# Patient Record
Sex: FEMALE | Race: WHITE | NOT HISPANIC OR LATINO | Employment: OTHER | URBAN - METROPOLITAN AREA
[De-identification: names, ages, dates, MRNs, and addresses within clinical notes are randomized per-mention and may not be internally consistent; named-entity substitution may affect disease eponyms.]

---

## 2022-07-01 PROBLEM — Z96.82 PRESENCE OF NEUROSTIMULATOR: Status: ACTIVE | Noted: 2022-07-01

## 2022-07-01 PROBLEM — G90.511 COMPLEX REGIONAL PAIN SYNDROME TYPE 1 OF RIGHT UPPER EXTREMITY: Status: ACTIVE | Noted: 2022-07-01

## 2022-07-01 PROBLEM — M47.816 SPONDYLOSIS OF LUMBAR REGION WITHOUT MYELOPATHY OR RADICULOPATHY: Status: ACTIVE | Noted: 2022-07-01

## 2022-07-05 ENCOUNTER — OFFICE VISIT (OUTPATIENT)
Dept: PAIN MEDICINE | Facility: CLINIC | Age: 57
End: 2022-07-05

## 2022-07-05 ENCOUNTER — PATIENT ROUNDING (BHMG ONLY) (OUTPATIENT)
Dept: PAIN MEDICINE | Facility: CLINIC | Age: 57
End: 2022-07-05

## 2022-07-05 VITALS
HEIGHT: 70 IN | HEART RATE: 73 BPM | WEIGHT: 138.85 LBS | DIASTOLIC BLOOD PRESSURE: 89 MMHG | TEMPERATURE: 97.5 F | BODY MASS INDEX: 19.88 KG/M2 | OXYGEN SATURATION: 94 % | SYSTOLIC BLOOD PRESSURE: 154 MMHG

## 2022-07-05 DIAGNOSIS — G90.511 COMPLEX REGIONAL PAIN SYNDROME TYPE 1 OF RIGHT UPPER EXTREMITY: Primary | ICD-10-CM

## 2022-07-05 DIAGNOSIS — M47.816 SPONDYLOSIS OF LUMBAR REGION WITHOUT MYELOPATHY OR RADICULOPATHY: ICD-10-CM

## 2022-07-05 DIAGNOSIS — R20.2 PARESTHESIA OF BOTH FEET: ICD-10-CM

## 2022-07-05 DIAGNOSIS — M24.541 FLEXION CONTRACTURE OF JOINT OF HAND, RIGHT: ICD-10-CM

## 2022-07-05 DIAGNOSIS — Z46.2 ENCOUNTER FOR FITTING AND ADJUSTMENT OF NEUROPACEMAKER OF SPINAL CORD: ICD-10-CM

## 2022-07-05 DIAGNOSIS — G47.01 INSOMNIA DUE TO MEDICAL CONDITION: ICD-10-CM

## 2022-07-05 DIAGNOSIS — M47.812 CERVICAL SPONDYLOSIS WITHOUT MYELOPATHY: ICD-10-CM

## 2022-07-05 DIAGNOSIS — Z96.82 PRESENCE OF NEUROSTIMULATOR: ICD-10-CM

## 2022-07-05 DIAGNOSIS — G90.511 COMPLEX REGIONAL PAIN SYNDROME TYPE 1 OF RIGHT UPPER EXTREMITY: ICD-10-CM

## 2022-07-05 PROCEDURE — 99205 OFFICE O/P NEW HI 60 MIN: CPT | Performed by: ANESTHESIOLOGY

## 2022-07-05 PROCEDURE — 95972 ALYS CPLX SP/PN NPGT W/PRGRM: CPT | Performed by: ANESTHESIOLOGY

## 2022-07-05 RX ORDER — NORTRIPTYLINE HYDROCHLORIDE 10 MG/1
CAPSULE ORAL
Qty: 60 CAPSULE | Refills: 1 | Status: SHIPPED | OUTPATIENT
Start: 2022-07-05 | End: 2023-03-20

## 2022-07-05 RX ORDER — ST. JOHN'S WORT 300 MG
400 CAPSULE ORAL 3 TIMES DAILY
Qty: 180 CAPSULE | Refills: 5 | Status: SHIPPED | OUTPATIENT
Start: 2022-07-05 | End: 2023-03-20

## 2022-07-05 RX ORDER — GABAPENTIN 300 MG/1
CAPSULE ORAL
COMMUNITY
Start: 2022-06-23

## 2022-07-05 RX ORDER — LEVOTHYROXINE SODIUM 0.03 MG/1
TABLET ORAL
COMMUNITY

## 2022-07-05 RX ORDER — ME-TETRAHYDROFOLATE/B12/HRB236 1-1-500 MG
1 CAPSULE ORAL DAILY
Qty: 90 CAPSULE | Refills: 1 | Status: SHIPPED | OUTPATIENT
Start: 2022-07-05 | End: 2023-03-20

## 2022-07-05 RX ORDER — ZOLPIDEM TARTRATE 10 MG/1
TABLET ORAL
COMMUNITY
Start: 2022-06-28

## 2022-07-05 RX ORDER — MULTIVITAMIN WITH IRON
100 TABLET ORAL DAILY
Qty: 30 TABLET | Refills: 0 | Status: SHIPPED | OUTPATIENT
Start: 2022-07-05 | End: 2023-03-20

## 2022-07-05 NOTE — PROGRESS NOTES
A MY-CHART MESSAGE HAS BEEN SENT TO THE PATIENT FOR PATIENT ROUNDING WITH [facility].

## 2022-07-06 ENCOUNTER — HOSPITAL ENCOUNTER (OUTPATIENT)
Dept: GENERAL RADIOLOGY | Facility: HOSPITAL | Age: 57
Discharge: HOME | End: 2022-07-06
Admitting: ANESTHESIOLOGY

## 2022-07-06 DIAGNOSIS — M47.812 CERVICAL SPONDYLOSIS WITHOUT MYELOPATHY: ICD-10-CM

## 2022-07-06 PROCEDURE — 72052 X-RAY EXAM NECK SPINE 6/>VWS: CPT

## 2022-07-20 ENCOUNTER — OUTSIDE FACILITY SERVICE (OUTPATIENT)
Dept: PAIN MEDICINE | Facility: CLINIC | Age: 57
End: 2022-07-20

## 2022-07-20 ENCOUNTER — TELEPHONE (OUTPATIENT)
Dept: PAIN MEDICINE | Facility: CLINIC | Age: 57
End: 2022-07-20

## 2022-07-20 DIAGNOSIS — G90.511 COMPLEX REGIONAL PAIN SYNDROME TYPE 1 OF RIGHT UPPER EXTREMITY: Primary | ICD-10-CM

## 2022-07-20 DIAGNOSIS — M47.812 CERVICAL SPONDYLOSIS WITHOUT MYELOPATHY: ICD-10-CM

## 2022-07-20 PROCEDURE — 64491 INJ PARAVERT F JNT C/T 2 LEV: CPT | Performed by: ANESTHESIOLOGY

## 2022-07-20 PROCEDURE — 99152 MOD SED SAME PHYS/QHP 5/>YRS: CPT | Performed by: ANESTHESIOLOGY

## 2022-07-20 PROCEDURE — 64490 INJ PARAVERT F JNT C/T 1 LEV: CPT | Performed by: ANESTHESIOLOGY

## 2022-07-20 NOTE — TELEPHONE ENCOUNTER
Patient called for update for referral placed for hand. Call placed to clinic they stated they were unable to locate the referral and asked for referral to be resent.

## 2022-07-21 ENCOUNTER — TELEPHONE (OUTPATIENT)
Dept: PAIN MEDICINE | Facility: CLINIC | Age: 57
End: 2022-07-21

## 2022-07-21 NOTE — TELEPHONE ENCOUNTER
Spoke with pt regarding how they’re feeling after yesterday’s procedure with Dr. Stone. Pt advised doing well with no complaints. I advised of next procedure date and time. Also advised nothing to eat or drink the night prior, must have a , and the procedure is in the [address]. Pt verbalized understanding, no further needs expressed.

## 2022-08-09 DIAGNOSIS — M47.812 CERVICAL SPONDYLOSIS WITHOUT MYELOPATHY: Primary | ICD-10-CM

## 2022-08-10 ENCOUNTER — OUTSIDE FACILITY SERVICE (OUTPATIENT)
Dept: PAIN MEDICINE | Facility: CLINIC | Age: 57
End: 2022-08-10

## 2022-08-10 PROCEDURE — 99152 MOD SED SAME PHYS/QHP 5/>YRS: CPT | Performed by: ANESTHESIOLOGY

## 2022-08-10 PROCEDURE — 64633 DESTROY CERV/THOR FACET JNT: CPT | Performed by: ANESTHESIOLOGY

## 2022-08-10 PROCEDURE — 64634 DESTROY C/TH FACET JNT ADDL: CPT | Performed by: ANESTHESIOLOGY

## 2022-08-11 ENCOUNTER — TELEPHONE (OUTPATIENT)
Dept: PAIN MEDICINE | Facility: CLINIC | Age: 57
End: 2022-08-11

## 2022-08-11 NOTE — TELEPHONE ENCOUNTER
Spoke with pt regarding yesterday’s procedure with Dr. Stone. Pt stated they are doing well, with no complaints. Advised of f/u appointment. Pt understood, and no further needs expressed

## 2022-08-24 ENCOUNTER — HOSPITAL ENCOUNTER (OUTPATIENT)
Dept: NEUROLOGY | Facility: HOSPITAL | Age: 57
Discharge: HOME | End: 2022-08-24
Admitting: ANESTHESIOLOGY

## 2022-08-24 DIAGNOSIS — R20.2 PARESTHESIA OF BOTH FEET: ICD-10-CM

## 2022-08-24 PROCEDURE — 95912 NRV CNDJ TEST 11-12 STUDIES: CPT

## 2022-08-24 PROCEDURE — 95886 MUSC TEST DONE W/N TEST COMP: CPT

## 2023-03-20 NOTE — PROGRESS NOTES
"Chief Complaint: \"Neck pain and numbness and burning in my feet.\"        History of Present Illness:   Patient: Ms. Althea Oneill, 57 y.o. female originally referred by Dr. Pilo Riggins in consultation for chronic intractable right upper extremity pain secondary to CRPS.  Patient reports a longstanding history of CRPS type one of the right upper extremity, which began after undergoing numerous surgical interventions of her right hand.  She has a spinal cord stimulator device that was implanted in 2008, with recent revision and IPG exchange in 2021.  She is a previous patient of a pain medicine center in [place], where she was treated for chronic neck pain and CRPS of the right upper extremity.  Her stimulator device does assist with reduction of her symptoms in her right arm.  She continues to have chronic mechanical/axial neck pain.  We last saw her on August 10, 2022, when she underwent bilateral cervical medial branch rhizotomies at C4, C5, and C6, from which she reports experiencing overall 70 to 80% pain relief and functional improvement that is ongoing.  She completed a recent course of physical therapy, and continues a home exercise program.  She also has some symptoms of paresthesias in her feet, she underwent electrodiagnostic studies of the bilateral lower extremities, which proved to be normal, there is no evidence of neuropathy or radiculopathy.  Although, she continues with significant burning and numbness localized to the dorsum and plantar aspects of her feet, on the left foot she will experience extension into her shin, and somewhat of a stalk like distribution.  She has seen a podiatrist, and had vascular studies which per her report were normal.  She returns today for postprocedure follow-up and evaluation.  SCS Device: Original spinal cord stimulator implant with paddle leads at about C3-C4 on the right side of the epidural space by Dr. Head at [facility] in 2008. On 9/2/2021: Spinal cord " stimulator lead revision and spinal cord stimulator IPG replacement by Dr. Pilo Riggins. [employer] rechargeable WaveStudyTubeiter alpha battery with 4 ports. Normal impedance was confirmed for all 16 contacts.    Pain Description: Previously a constant neck and right upper extremity pain with intermittent exacerbation, described as like a knife in my neck, burning and throbbing (arm) sensation.   Radiation of Pain: The pain does not radiate. She is able to discriminate 2 different pains.   Pain intensity today: 3/10  Average pain intensity last week: 4/10  Pain intensity ranges from: 3/10 to 6/10  Aggravating factors: Pain increases with extension, rotation of the cervical spine. Her right arm pain increases with touch and use   Alleviating factors: Pain decreases with SCS, gabapentin  Associated Symptoms:   Patient reports pain, numbness (fingers), and weakness (due to hand deformity) in the right upper extremity.   Patient denies any new bladder or bowel problems.   Patient denies difficulties with her balance or recent falls.   Hx Migraines  Pain interferes with sleep causing sleep fragmentation   Stiffness    Lower Extremity Numbness: Patient reports a history of numbness and burning to her bilateral feet for about 2 years. she underwent electrodiagnostic studies of the bilateral lower extremities, which proved to be normal, there is no evidence of neuropathy or radiculopathy.  Although, she continues with significant burning and numbness localized to the dorsum and plantar aspects of her feet, on the left foot she will experience extension into her shin, and somewhat of a sock like distribution.  She has seen a podiatrist, and had vascular studies which per her report were normal.  Her feet are cold, she does report some purple like discoloration mainly to the dorsum aspect of the left foot.  Pain Description: constant bilateral feet numbness/burning, described as burning, cold, numbness, tingling,  cramping.   Radiation of Pain: The numbness will radiate primarily into her left shin and a stock like distribution.   Aggravating factors: Symptoms increase with certain activities  Alleviating factors: Symptoms decrease with gabapentin, stretches  Associated Symptoms:   Patient reports numbness, burning, discoloration, in her feet.        Review of previous therapies and additional medical records:  Althea Oneill has already failed the following measures, including:   Conservative Measures: Oral analgesics, topical analgesics, ice, heat, physical therapy   Interventional Measures: SGB, KRISTIN as referenced above  08/10/2022: Bilateral cervical RFA  Surgical Measures: No history of previous cervical spine (except SCS placement) or shoulder surgery. No history of previous lumbar spine or hip surgery   Althea Oneill underwent neurosurgical consultation in [place] about 3 years ago and was found not to be a surgical candidate.  Althea Oneill is a healthy adult   In terms of current analgesics, Althea Oneill takes: gabapentin. Patient also takes Ambien  I have reviewed [place] Report is consistent with medication reconciliation.  SOAPP: Low Risk       Global Pain Scale 07-05  2022 03-21 2023         Pain 14 10         Feelings 22 14         Clinical outcomes 15 16         Activities 5 0         GPS Total: 56 40           Review of New Diagnostic Studies:  Cervical spine x-ray flexion-extension views 7/6/2022: Spinal cord stimulator lead seen from the C2-C4 levels.  Anterior listhesis of C5 on C6 and C7 on T1.  Mild disc space height loss at C5-C6.  There is multilevel cervical facet arthritis, most significant on the right at C3-C4 and C5-C6  EMG/NCV of the bilateral lower extremities 8/24/2022: Normal    Review of Diagnostic Studies:   MRI of the lumbar spine without contrast on 10/1/2021 revealed appropriate vertebral body heights and alignment.  Degenerative disc disease and facet  arthropathy from L3-L4 through L5-S1.  At L4 L5: mild bilateral foraminal stenosis.  Otherwise, no significant canal or foraminal stenosis.    Review of Systems   HENT: Positive for postnasal drip and rhinorrhea.    Eyes: Positive for itching.   Musculoskeletal: Positive for arthralgias, back pain, gait problem, joint swelling, neck pain and neck stiffness.   Neurological: Positive for numbness and headaches.   All other systems reviewed and are negative.        Patient Active Problem List   Diagnosis   • Complex regional pain syndrome type 1 of right upper extremity   • Presence of neurostimulator   • Spondylosis of lumbar region without myelopathy or radiculopathy   • Paresthesia of both feet   • Encounter for fitting and adjustment of neuropacemaker of spinal cord   • Flexion contracture of joint of hand, right   • Cervical spondylosis without myelopathy   • Insomnia due to medical condition       Past Medical History:   Diagnosis Date   • Chronic pain    • Depression    • Endometriosis    • Extremity pain    • Headache    • Joint pain    • Low back pain    • Migraine 1980   • Neck pain    • Osteoarthritis    • Reflex sympathetic dystrophy    • Spinal stenosis          Past Surgical History:   Procedure Laterality Date   • EPIDURAL BLOCK  2021   • NECK SURGERY  2008   • SPINAL CORD STIMULATOR IMPLANT  2008    [facility]   • SPINE SURGERY  2008         Family History   Problem Relation Age of Onset   • Alcohol abuse Mother    • Arthritis Mother    • Cancer Mother    • COPD Mother    • Osteoporosis Mother    • Cancer Father    • Alcohol abuse Sister    • Alcohol abuse Brother          Social History     Socioeconomic History   • Marital status:    Tobacco Use   • Smoking status: Never   • Smokeless tobacco: Never   Substance and Sexual Activity   • Alcohol use: Yes     Comment: A couple of a month   • Drug use: Never   • Sexual activity: Not Currently     Partners: Male     Birth control/protection:  "Surgical, Post-menopausal, Hysterectomy           Current Outpatient Medications:   •  gabapentin (NEURONTIN) 300 MG capsule, , Disp: , Rfl:   •  levothyroxine (SYNTHROID, LEVOTHROID) 25 MCG tablet, levothyroxine 25 mcg tablet, Disp: , Rfl:   •  zolpidem (AMBIEN) 10 MG tablet, zolpidem 10 mg tablet  TAKE 1 TABLET BY MOUTH NIGHTLY AT BEDTIME AS NEEDED INSOMNIA, Disp: , Rfl:       Allergies   Allergen Reactions   • Iodine Other (See Comments)     Causes respiratory distress- contrast only. Pt reports other Iodine is fine    • Erythromycin Base Hives         Ht 177.8 cm (70\")   Wt 65.7 kg (144 lb 12.8 oz)   BMI 20.78 kg/m²       Physical Exam:  Constitutional: Patient appears well-developed, well-nourished, well-hydrated  HEENT: Head: Normocephalic and atraumatic  Eyes: Conjunctivae and lids are normal  Pupils: Equal, round, reactive to light  Neck: Trachea normal. Neck supple. No JVD present.   Lymphatic: No cervical adenopathy  Peripheral vascular exam:  Posterior tibialis: right 2+ and left 2+. Dorsalis pedis: right 2+ and left 2+. No edema. Presence of varicose veins.  CRT intact  Musculoskeletal   Gait and station: Gait evaluation demonstrated a normal gait. Able to walk on heels, toes, tandem walking   Cervical spine: Passive and active range of motion are improved with some pain. Extension, flexion, lateral flexion, rotation of the cervical spine did not increase or reproduce much pain today. Cervical facet joint loading maneuvers are equivocal.   Muscles: Presence of active trigger points at the levator scapulae   Shoulders: The range of motion of the glenohumeral joints is full and without pain. Rotator cuff strength is 5/5.   Right upper extremity/hand: No hyperesthesia. Hyperalgesia and allodynia, skin color changes, edema, decreased range of motion and motor dysfunction with weakness, tremor, dystonia and trophic changes.  Motor strength in the upper extremities 4/5 in her right hand, rest " 5/5  Neurological:   Patient is alert and oriented to person, place, and time.   Speech: Normal.   Cortical function: Normal mental status.   Reflex Scores:  Right brachioradialis: 2+  Left brachioradialis: 2+  Right biceps: 2+  Left biceps: 2+  Right triceps: 2+  Left triceps: 2+  Right patellar: 3+  Left patellar: 3+  Right Achilles: 2+  Left Achilles: 2+  Motor strength: 5/5  Motor Tone: Normal  Involuntary movements: None.   Superficial/Primitive Reflexes: Primitive reflexes were absent.   Right Joseph: Absent  Left Joseph: Absent  Right ankle clonus: Absent  Left ankle clonus: Absent   Babinsky: Absent  Spurling sign: Negative. Neck tornado test: Negative. Lhermitte sign: Negative. Long tract signs: Negative.   Sensory exam: Intact to light touch, intact pain and temperature sensation, intact vibration sensation and normal proprioception.   There is decrease sensation to the dorsum aspect of her left foot, with light touch.  Coordination: Normal balance and negative Romberg's sign   Skin and subcutaneous tissue: Skin is warm and intact. No rash noted. No cyanosis.   Psychiatric: Judgment and insight: Normal. Recent and remote memory: Intact. Mood and affect: Normal.     Analysis of the spinal cord stimulator device with complex spinal cord stimulator reprogramming   Original implantation date: 2008 [facility], IPG exchange: 09/2021  Lead: Surgical paddle: [employer] Artisan lead with the top electrodes projecting at the level of the superior endplate of the C3 or C4 vertebral level   IPG: [employer] Spectra WaveWriter   System is full body MRI compatible   Impedance: Normal  Patient used the [employer] spinal cord stimulator device 100% of the time   The spinal cord stimulator device was reprogrammed under my direct supervision and 5 programs were created by adjusting electrode polarities, amplitude, pulse width, pulse rate, (capturing even her feet with program 5), in the following  fashion;  Program ONE (Preferred Program):    Electrode polarities: 1-, 2-, 4+  Amplitude: 0.3 mA     Pulse width: 190 mcs  Rate: 90 Hz      ASSESSMENT:   1. Paresthesia of both feet    2. Complex regional pain syndrome type 1 of right upper extremity    3. Cervical spondylosis without myelopathy    4. Flexion contracture of joint of hand, right    5. Spondylosis of lumbar region without myelopathy or radiculopathy    6. Presence of neurostimulator          PLAN/MEDICAL DECISION MAKING:  Ms. Althea Oneill, 57 y.o. female presents with a longstanding history of CRPS type one of the right upper extremity, which began after undergoing numerous surgical interventions of her right hand. Patient was diagnosed with complex regional pain syndrome type 1 along with contractures in her right hand after her last hand surgery in 2008.  She has a spinal cord stimulator device that was implanted in 2008, with recent revision and IPG exchange in 2021 24 MRI compatible system.  She is a previous patient of a pain medicine center in [place], where she was treated for chronic neck pain and CRPS of the right upper extremity.  Her stimulator device does assist with reduction of her symptoms in her right arm.  She continues to have chronic mechanical/axial neck pain.  More recently, she underwent cervical epidural steroid injections in [place] with relief of her right upper arm/shoulder pain. She also noticed paresthesia in her feet that has been stable. Patient failed to obtain pain relief with conservative measures including oral analgesics, SGB, KRISTIN, and physical therapy.  MRI of the lumbar spine without contrast on 10/1/2021 revealed degenerative disc disease and facet arthropathy from L3-L4 through L5-S1 without evidence of critical stenosis.  At L4 L5: mild bilateral foraminal stenosis. She also has some symptoms of paresthesias in her feet that has been ongoing for about 2 years, she underwent electrodiagnostic  studies of the bilateral lower extremities, which proved to be normal, there is no evidence of neuropathy or radiculopathy.  She has been on gabapentin, with some marginal benefit.  Although, she continues with significant burning and numbness localized to the dorsum and plantar aspects of her feet, on the left foot she will experience extension into her shin, and somewhat of a stock like distribution.  She has seen a podiatrist, and had vascular studies which per her report were normal.  Upon physical examination, she does have some decrease sensation to particular her left foot, on the dorsum aspect.  Her feet are cool to touch, there is some mild discoloration on the dorsum aspect of her left foot.  She denies significant swelling, open wounds, etc.  She has not seen a vascular specialist nor a neurologist.  I suspect this could be of a neuropathic origin, particularly a small fiber or idiopathic neuropathy.  I am going to send her to a neurologist to assist in diagnosing if she has some type of neuropathy.  I am also going to obtain bilateral feet x-rays, she has never seen an actual foot specialist, she does have some pain in the area of the plantar fascia.  I would also consider a second opinion podiatry consultation.  I have reviewed all available patient's medical records as well as previous therapies as referenced above. I had a lengthy conversation with Ms. Althea Oneill regarding her chronic pain condition and potential therapeutic options including risks, benefits, alternative therapies, to name a few. Therefore, I have proposed the following plan:  1. Interventional pain management measures: None indicated at this time.  Depending on continued response, may consider repeating bilateral cervical medial branch rhizotomies at C4, C5, C6; for bilateral cervical facet joints at C4-C5, and C5-C6. Otherwise, we could consider a cervical epidural steroid injection by interlaminar approach versus  transforaminal approach  Versus additional diagnostic imaging such as MRI with and without contrast of the cervical spine.  Then, based on findings we could determine as to whether neurosurgical consultation would be indicated.  Her IPG has 4 ports and only 2 are used with her surgical paddle.  We could consider a spinal cord stimulator trial to see if we could capture areas of pain not addressed with the current system.  If successful, then, leads could be connected to the available IPG ports.  2. Diagnostic studies:  A. We may obtain MRI of the cervical spine with/without contrast   B. Bilateral foot x-rays  3. Pharmacological measures: Reviewed and discussed;   A. Patient takes gabapentin. Patient also takes Ambien  B. Continue prilocaine 2%, lidocaine 10%, capsaicin 0.001% and mannitol 20% cream, apply 1 to 2 grams of cream to the affected areas every 4 to 6 hours as needed  4. Long-term rehabilitation efforts:  A. The patient does not have a history of falls. I did complete a risk assessment for falls.   B. Patient will start a comprehensive physical therapy program for upper body strengthening/posture correction, core strengthening, neurodynamics, desensitization techniques, E-STIM, myofascial release, cupping, dry needling, posture/position body mechanics, home exercises, once pain is under control  C. Start an exercise program such as water therapy and swimming  D. Contrast therapy: Apply ice-packs for 15-20 minutes, followed by heating pads for 15-20 minutes to affected area  E. Referral to neurology  5. The patient has been instructed to contact my office with any questions or difficulties. The patient understands the plan and agrees to proceed accordingly.        Pain Medications             gabapentin (NEURONTIN) 300 MG capsule           Please note that portions of this note were completed with a voice recognition program.     TAMMY Oro    Patient Care Team:  Emilee Dubois MD as PCP -  General (Family Medicine)  Solomon Stone MD as Consulting Physician (Pain Medicine)  Renetta Goyal APRN as Nurse Practitioner (Nurse Practitioner)     No orders of the defined types were placed in this encounter.        No future appointments.

## 2023-03-21 ENCOUNTER — OFFICE VISIT (OUTPATIENT)
Dept: PAIN MEDICINE | Facility: CLINIC | Age: 58
End: 2023-03-21
Payer: MEDICARE

## 2023-03-21 VITALS — WEIGHT: 144.8 LBS | BODY MASS INDEX: 20.73 KG/M2 | HEIGHT: 70 IN

## 2023-03-21 DIAGNOSIS — G90.511 COMPLEX REGIONAL PAIN SYNDROME TYPE 1 OF RIGHT UPPER EXTREMITY: ICD-10-CM

## 2023-03-21 DIAGNOSIS — M47.812 CERVICAL SPONDYLOSIS WITHOUT MYELOPATHY: ICD-10-CM

## 2023-03-21 DIAGNOSIS — R20.2 PARESTHESIA OF BOTH FEET: ICD-10-CM

## 2023-03-21 DIAGNOSIS — M47.816 SPONDYLOSIS OF LUMBAR REGION WITHOUT MYELOPATHY OR RADICULOPATHY: ICD-10-CM

## 2023-03-21 DIAGNOSIS — M24.541 FLEXION CONTRACTURE OF JOINT OF HAND, RIGHT: ICD-10-CM

## 2023-03-21 DIAGNOSIS — Z96.82 PRESENCE OF NEUROSTIMULATOR: ICD-10-CM

## 2023-03-21 PROCEDURE — 1159F MED LIST DOCD IN RCRD: CPT | Performed by: NURSE PRACTITIONER

## 2023-03-21 PROCEDURE — 1160F RVW MEDS BY RX/DR IN RCRD: CPT | Performed by: NURSE PRACTITIONER

## 2023-03-21 PROCEDURE — 99214 OFFICE O/P EST MOD 30 MIN: CPT | Performed by: NURSE PRACTITIONER

## 2023-03-21 PROCEDURE — 1125F AMNT PAIN NOTED PAIN PRSNT: CPT | Performed by: NURSE PRACTITIONER

## 2023-03-23 DIAGNOSIS — R20.2 PARESTHESIA OF BOTH FEET: Primary | ICD-10-CM

## 2023-04-10 ENCOUNTER — HOSPITAL ENCOUNTER (OUTPATIENT)
Dept: GENERAL RADIOLOGY | Facility: HOSPITAL | Age: 58
Discharge: HOME | End: 2023-04-10
Admitting: NURSE PRACTITIONER
Payer: MEDICARE

## 2023-04-10 DIAGNOSIS — R20.2 PARESTHESIA OF BOTH FEET: ICD-10-CM

## 2023-04-10 PROCEDURE — 73630 X-RAY EXAM OF FOOT: CPT

## 2024-02-07 ENCOUNTER — TELEPHONE (OUTPATIENT)
Dept: PAIN MEDICINE | Facility: CLINIC | Age: 59
End: 2024-02-07
Payer: MEDICARE

## 2024-02-07 DIAGNOSIS — R20.2 PARESTHESIA OF BOTH FEET: Primary | ICD-10-CM

## 2024-02-07 NOTE — TELEPHONE ENCOUNTER
"Spoke with pt and apologized that she was given misinformation regarding her records related to Dr. Martinez. (THE INFORMATION IS IN THE CHART UNDER THE \"E\"-CARE EVERYWHERE) Renetta advised that there isn't much that we can offer her, as if it is is just foot localized, then we can refer the pt to a ortho foot doctor, and send a cream in. Pt agreed to this. Sending referral and cream to [facility].   "

## 2024-09-18 NOTE — TELEPHONE ENCOUNTER
Provider: TANNER    Caller: PATIENT    Phone Number: [phone]    Reason for Call: PATIENT STATES SHE SPOKE WITH SOMEONE WHO TOLD HER THAT SHE NEEDED TO GET HER NOTES FROM  NEUROLOGY DR. TAYLOR BEFORE SHE CAN MAKE A FOLLOW UP APPT WITH [facility]. SHE STATES  IS REQUESTING A WRITTEN REQUEST FAXED [phone]. NOTE FROM DR. TAYLOR IN [place] 01/04/24.        [FreeTextEntry1] :  I, Piper Clemens, am scribing for and the presence of Dr. Larson the following sections: HPI, PMH,Family/social history, ROS, Physical Exam, Assessment / Plan.I, Uriel Larson, personally performed the services described in the documentation, reviewed the documentation recorded by the scribe in my presence and it accurately and completely records my words and actions.